# Patient Record
Sex: FEMALE | Race: ASIAN | NOT HISPANIC OR LATINO | URBAN - METROPOLITAN AREA
[De-identification: names, ages, dates, MRNs, and addresses within clinical notes are randomized per-mention and may not be internally consistent; named-entity substitution may affect disease eponyms.]

---

## 2018-07-18 ENCOUNTER — OUTPATIENT (OUTPATIENT)
Dept: OUTPATIENT SERVICES | Facility: HOSPITAL | Age: 38
LOS: 1 days | End: 2018-07-18
Payer: COMMERCIAL

## 2018-07-18 VITALS
HEIGHT: 60.5 IN | TEMPERATURE: 98 F | DIASTOLIC BLOOD PRESSURE: 87 MMHG | SYSTOLIC BLOOD PRESSURE: 144 MMHG | HEART RATE: 82 BPM | RESPIRATION RATE: 16 BRPM | WEIGHT: 190.04 LBS

## 2018-07-18 DIAGNOSIS — Z98.89 OTHER SPECIFIED POSTPROCEDURAL STATES: Chronic | ICD-10-CM

## 2018-07-18 DIAGNOSIS — Z90.49 ACQUIRED ABSENCE OF OTHER SPECIFIED PARTS OF DIGESTIVE TRACT: Chronic | ICD-10-CM

## 2018-07-18 DIAGNOSIS — D25.9 LEIOMYOMA OF UTERUS, UNSPECIFIED: ICD-10-CM

## 2018-07-18 DIAGNOSIS — N93.8 OTHER SPECIFIED ABNORMAL UTERINE AND VAGINAL BLEEDING: ICD-10-CM

## 2018-07-18 DIAGNOSIS — E55.9 VITAMIN D DEFICIENCY, UNSPECIFIED: ICD-10-CM

## 2018-07-18 DIAGNOSIS — D50.9 IRON DEFICIENCY ANEMIA, UNSPECIFIED: ICD-10-CM

## 2018-07-18 DIAGNOSIS — Z98.890 OTHER SPECIFIED POSTPROCEDURAL STATES: Chronic | ICD-10-CM

## 2018-07-18 DIAGNOSIS — R10.2 PELVIC AND PERINEAL PAIN: ICD-10-CM

## 2018-07-18 DIAGNOSIS — Z01.818 ENCOUNTER FOR OTHER PREPROCEDURAL EXAMINATION: ICD-10-CM

## 2018-07-18 DIAGNOSIS — I10 ESSENTIAL (PRIMARY) HYPERTENSION: ICD-10-CM

## 2018-07-18 LAB
ALBUMIN SERPL ELPH-MCNC: 3.6 G/DL — SIGNIFICANT CHANGE UP (ref 3.3–5)
ALP SERPL-CCNC: 78 U/L — SIGNIFICANT CHANGE UP (ref 40–120)
ALT FLD-CCNC: 24 U/L — SIGNIFICANT CHANGE UP (ref 12–78)
ANION GAP SERPL CALC-SCNC: 7 MMOL/L — SIGNIFICANT CHANGE UP (ref 5–17)
AST SERPL-CCNC: 17 U/L — SIGNIFICANT CHANGE UP (ref 15–37)
BILIRUB SERPL-MCNC: 0.3 MG/DL — SIGNIFICANT CHANGE UP (ref 0.2–1.2)
BUN SERPL-MCNC: 14 MG/DL — SIGNIFICANT CHANGE UP (ref 7–23)
CALCIUM SERPL-MCNC: 8.8 MG/DL — SIGNIFICANT CHANGE UP (ref 8.5–10.1)
CHLORIDE SERPL-SCNC: 107 MMOL/L — SIGNIFICANT CHANGE UP (ref 96–108)
CO2 SERPL-SCNC: 27 MMOL/L — SIGNIFICANT CHANGE UP (ref 22–31)
CREAT SERPL-MCNC: 0.64 MG/DL — SIGNIFICANT CHANGE UP (ref 0.5–1.3)
GLUCOSE SERPL-MCNC: 107 MG/DL — HIGH (ref 70–99)
HCG SERPL-ACNC: <1 MIU/ML — SIGNIFICANT CHANGE UP
HCT VFR BLD CALC: 34.1 % — LOW (ref 34.5–45)
HGB BLD-MCNC: 10.9 G/DL — LOW (ref 11.5–15.5)
MCHC RBC-ENTMCNC: 24.3 PG — LOW (ref 27–34)
MCHC RBC-ENTMCNC: 32 GM/DL — SIGNIFICANT CHANGE UP (ref 32–36)
MCV RBC AUTO: 76.1 FL — LOW (ref 80–100)
NRBC # BLD: 0 /100 WBCS — SIGNIFICANT CHANGE UP (ref 0–0)
PLATELET # BLD AUTO: 336 K/UL — SIGNIFICANT CHANGE UP (ref 150–400)
POTASSIUM SERPL-MCNC: 4.2 MMOL/L — SIGNIFICANT CHANGE UP (ref 3.5–5.3)
POTASSIUM SERPL-SCNC: 4.2 MMOL/L — SIGNIFICANT CHANGE UP (ref 3.5–5.3)
PROT SERPL-MCNC: 7.5 G/DL — SIGNIFICANT CHANGE UP (ref 6–8.3)
RBC # BLD: 4.48 M/UL — SIGNIFICANT CHANGE UP (ref 3.8–5.2)
RBC # FLD: 15.2 % — HIGH (ref 10.3–14.5)
SODIUM SERPL-SCNC: 141 MMOL/L — SIGNIFICANT CHANGE UP (ref 135–145)
WBC # BLD: 6.14 K/UL — SIGNIFICANT CHANGE UP (ref 3.8–10.5)
WBC # FLD AUTO: 6.14 K/UL — SIGNIFICANT CHANGE UP (ref 3.8–10.5)

## 2018-07-18 NOTE — H&P PST ADULT - PROBLEM SELECTOR PLAN 1
38 yo female scheduled for Total Abdominal Hysterectomy-Bilateral Salpingo-oophorectomy on 7/27/18 with Dr Narvaez.  Check labs CBC CMP Type and Screen HCG  Stop Naprosyn 7/18/18  Hibiclens Scrubs for 2 days   Patient and her  verbalize understanding of instructions    Patient requests private room Admitting was notified

## 2018-07-18 NOTE — H&P PST ADULT - ASSESSMENT
38 yo female scheduled for Total Abdominal Hysterectomy-Bilateral Salpingo-oophorectomy on 7/27/18 with Dr Narvaez.

## 2018-07-18 NOTE — H&P PST ADULT - ABILITY TO HEAR (WITH HEARING AID OR HEARING APPLIANCE IF NORMALLY USED):
details… Adequate: hears normal conversation without difficulty/language barrier  understands some English

## 2018-07-18 NOTE — H&P PST ADULT - FAMILY HISTORY
Mother  Still living? No  Hypertension, Age at diagnosis: Age Unknown     Sibling  Still living? Unknown  Hypertension, Age at diagnosis: Age Unknown

## 2018-07-18 NOTE — H&P PST ADULT - NSANTHOSAYNRD_GEN_A_CORE
No. JOVI screening performed.  STOP BANG Legend: 0-2 = LOW Risk; 3-4 = INTERMEDIATE Risk; 5-8 = HIGH Risk

## 2018-07-23 RX ORDER — SODIUM CHLORIDE 9 MG/ML
1000 INJECTION, SOLUTION INTRAVENOUS
Qty: 0 | Refills: 0 | Status: DISCONTINUED | OUTPATIENT
Start: 2018-07-24 | End: 2018-07-24

## 2018-07-24 ENCOUNTER — INPATIENT (INPATIENT)
Facility: HOSPITAL | Age: 38
LOS: 3 days | Discharge: HOME | DRG: 743 | End: 2018-07-28
Attending: OBSTETRICS & GYNECOLOGY | Admitting: OBSTETRICS & GYNECOLOGY
Payer: COMMERCIAL

## 2018-07-24 VITALS
TEMPERATURE: 99 F | DIASTOLIC BLOOD PRESSURE: 88 MMHG | RESPIRATION RATE: 16 BRPM | OXYGEN SATURATION: 100 % | HEART RATE: 102 BPM | SYSTOLIC BLOOD PRESSURE: 127 MMHG

## 2018-07-24 DIAGNOSIS — Z90.49 ACQUIRED ABSENCE OF OTHER SPECIFIED PARTS OF DIGESTIVE TRACT: Chronic | ICD-10-CM

## 2018-07-24 DIAGNOSIS — Z98.89 OTHER SPECIFIED POSTPROCEDURAL STATES: Chronic | ICD-10-CM

## 2018-07-24 DIAGNOSIS — N93.8 OTHER SPECIFIED ABNORMAL UTERINE AND VAGINAL BLEEDING: ICD-10-CM

## 2018-07-24 DIAGNOSIS — Z98.890 OTHER SPECIFIED POSTPROCEDURAL STATES: Chronic | ICD-10-CM

## 2018-07-24 PROCEDURE — 88307 TISSUE EXAM BY PATHOLOGIST: CPT | Mod: 26

## 2018-07-24 PROCEDURE — 88305 TISSUE EXAM BY PATHOLOGIST: CPT | Mod: 26

## 2018-07-24 RX ORDER — CEFAZOLIN SODIUM 1 G
2000 VIAL (EA) INJECTION ONCE
Qty: 0 | Refills: 0 | Status: COMPLETED | OUTPATIENT
Start: 2018-07-24 | End: 2018-07-24

## 2018-07-24 RX ORDER — HYDROMORPHONE HYDROCHLORIDE 2 MG/ML
30 INJECTION INTRAMUSCULAR; INTRAVENOUS; SUBCUTANEOUS
Qty: 0 | Refills: 0 | Status: DISCONTINUED | OUTPATIENT
Start: 2018-07-24 | End: 2018-07-26

## 2018-07-24 RX ORDER — ONDANSETRON 8 MG/1
4 TABLET, FILM COATED ORAL ONCE
Qty: 0 | Refills: 0 | Status: DISCONTINUED | OUTPATIENT
Start: 2018-07-24 | End: 2018-07-27

## 2018-07-24 RX ORDER — ACETAMINOPHEN 500 MG
1000 TABLET ORAL ONCE
Qty: 0 | Refills: 0 | Status: COMPLETED | OUTPATIENT
Start: 2018-07-24 | End: 2018-07-24

## 2018-07-24 RX ORDER — CEFAZOLIN SODIUM 1 G
2000 VIAL (EA) INJECTION EVERY 8 HOURS
Qty: 0 | Refills: 0 | Status: COMPLETED | OUTPATIENT
Start: 2018-07-24 | End: 2018-07-25

## 2018-07-24 RX ORDER — DOCUSATE SODIUM 100 MG
100 CAPSULE ORAL THREE TIMES A DAY
Qty: 0 | Refills: 0 | Status: DISCONTINUED | OUTPATIENT
Start: 2018-07-24 | End: 2018-07-28

## 2018-07-24 RX ORDER — LOSARTAN POTASSIUM 100 MG/1
50 TABLET, FILM COATED ORAL DAILY
Qty: 0 | Refills: 0 | Status: DISCONTINUED | OUTPATIENT
Start: 2018-07-24 | End: 2018-07-28

## 2018-07-24 RX ORDER — CHOLECALCIFEROL (VITAMIN D3) 125 MCG
1 CAPSULE ORAL
Qty: 0 | Refills: 0 | COMMUNITY

## 2018-07-24 RX ORDER — FAMOTIDINE 10 MG/ML
0 INJECTION INTRAVENOUS
Qty: 0 | Refills: 0 | COMMUNITY

## 2018-07-24 RX ORDER — SODIUM CHLORIDE 9 MG/ML
1000 INJECTION, SOLUTION INTRAVENOUS
Qty: 0 | Refills: 0 | Status: DISCONTINUED | OUTPATIENT
Start: 2018-07-24 | End: 2018-07-26

## 2018-07-24 RX ORDER — OXYCODONE HYDROCHLORIDE 5 MG/1
5 TABLET ORAL EVERY 4 HOURS
Qty: 0 | Refills: 0 | Status: DISCONTINUED | OUTPATIENT
Start: 2018-07-24 | End: 2018-07-24

## 2018-07-24 RX ORDER — FAMOTIDINE 10 MG/ML
20 INJECTION INTRAVENOUS
Qty: 0 | Refills: 0 | Status: DISCONTINUED | OUTPATIENT
Start: 2018-07-24 | End: 2018-07-28

## 2018-07-24 RX ORDER — HYDROMORPHONE HYDROCHLORIDE 2 MG/ML
0.5 INJECTION INTRAMUSCULAR; INTRAVENOUS; SUBCUTANEOUS
Qty: 0 | Refills: 0 | Status: DISCONTINUED | OUTPATIENT
Start: 2018-07-24 | End: 2018-07-24

## 2018-07-24 RX ORDER — ONDANSETRON 8 MG/1
4 TABLET, FILM COATED ORAL ONCE
Qty: 0 | Refills: 0 | Status: DISCONTINUED | OUTPATIENT
Start: 2018-07-24 | End: 2018-07-24

## 2018-07-24 RX ORDER — SODIUM CHLORIDE 9 MG/ML
1000 INJECTION, SOLUTION INTRAVENOUS
Qty: 0 | Refills: 0 | Status: DISCONTINUED | OUTPATIENT
Start: 2018-07-24 | End: 2018-07-24

## 2018-07-24 RX ADMIN — SODIUM CHLORIDE 75 MILLILITER(S): 9 INJECTION, SOLUTION INTRAVENOUS at 08:03

## 2018-07-24 RX ADMIN — HYDROMORPHONE HYDROCHLORIDE 30 MILLILITER(S): 2 INJECTION INTRAMUSCULAR; INTRAVENOUS; SUBCUTANEOUS at 15:08

## 2018-07-24 RX ADMIN — Medication 100 MILLIGRAM(S): at 20:48

## 2018-07-24 RX ADMIN — SODIUM CHLORIDE 75 MILLILITER(S): 9 INJECTION, SOLUTION INTRAVENOUS at 14:39

## 2018-07-24 RX ADMIN — HYDROMORPHONE HYDROCHLORIDE 30 MILLILITER(S): 2 INJECTION INTRAMUSCULAR; INTRAVENOUS; SUBCUTANEOUS at 19:17

## 2018-07-24 RX ADMIN — FAMOTIDINE 20 MILLIGRAM(S): 10 INJECTION INTRAVENOUS at 17:21

## 2018-07-24 RX ADMIN — HYDROMORPHONE HYDROCHLORIDE 30 MILLILITER(S): 2 INJECTION INTRAMUSCULAR; INTRAVENOUS; SUBCUTANEOUS at 16:11

## 2018-07-24 NOTE — BRIEF OPERATIVE NOTE - PROCEDURE
<<-----Click on this checkbox to enter Procedure Repair of cystotomy  07/24/2018    Active  [email or web address]  Repair of serosal tear of intestine  07/24/2018    Active  [email or web address]  Lysis of adhesions  07/24/2018    Active  [email or web address]

## 2018-07-24 NOTE — BRIEF OPERATIVE NOTE - PRE-OP DX
Intramural, submucous, and subserous leiomyoma of uterus  07/24/2018    Aziza Trevino  Pelvic pain in female  07/24/2018    Aziza Trevino  Uterine bleeding  07/24/2018    Aziza Trevino

## 2018-07-24 NOTE — BRIEF OPERATIVE NOTE - POST-OP DX
Endometriosis  07/24/2018    Aziza Trevino  Intramural, submucous, and subserous leiomyoma of uterus  07/24/2018    Aziza Trevino

## 2018-07-24 NOTE — BRIEF OPERATIVE NOTE - OPERATION/FINDINGS
extensive adhesions noted in the pelvic region. with evidence of endometriosis and ovarian endometrioma

## 2018-07-24 NOTE — ASU PREOP CHECKLIST - HEIGHT IN CM
Subcostal TAP    Patient location during procedure: pre-op  Start time: 6/16/2017 8:15 AM  Stop time: 6/16/2017 8:25 AM  Reason for block: at surgeon's request and post-op pain management  Performed by  Anesthesiologist: DANE BARRETT  CRNA: SB SUN  Preanesthetic Checklist  Completed: patient identified, site marked, surgical consent, pre-op evaluation, timeout performed, IV checked, risks and benefits discussed and monitors and equipment checked  Peripheral Block Prep:  Sterile barriers:cap, gloves, sterile barriers and mask  Prep: ChloraPrep  Patient monitoring: blood pressure monitoring, continuous pulse oximetry and EKG  Peripheral Procedure  Nursing cardiac assessment comments yes: GA.  Guidance:ultrasound guided  Images:still images obtained    Laterality:Bilateral  Block Type:TAP  Injection Technique:single-shot  Needle Type:short-bevel  Needle Gauge:20 G    Medications  Comment:Block Injection:  LA dose divided between Right and Left block       Adjuncts:  Decadron 4mg PSF, Buprenex 0.3mg (Per total volume of LA)  Local Injected:bupivacaine 0.25% Local Amount Injected:40mL  Post Assessment  Injection Assessment: negative aspiration for heme, incremental injection and no paresthesia on injection  Patient Tolerance:comfortable throughout block  Complications:no  Additional Notes  The pt was placed in the Supine Position and was  anesthetized with:       General Anesthesia     Under Ultrasound guidance, a BBraun 4inch 360 degree needle was advanced with Normal Saline hydro dissection of tissue.  The Internal Oblique and Transversus Abdominus muscles where visualized.  At or before the aponeurosis of Internal Oblique, local anesthetic spread was visualized in the Transversus Abdominus Plane. Injection was made incrementally with aspiration every 5 mls.  There was no  intravascular injection,  injection pressure was normal, there was no neural injection, and the procedure was completed without  difficulty.  Thank You.             153.92

## 2018-07-24 NOTE — PATIENT PROFILE ADULT. - PMH
Endometriosis    Hypertension    Hypertension    Iron deficiency anemia    Leiomyoma of uterus    Vitamin D deficiency

## 2018-07-24 NOTE — PATIENT PROFILE ADULT. - LANGUAGE ASSISTANCE NEEDED
gabriel is aware that she can have  at any time during hospitalization Yes-Patient/Caregiver accepts free interpretation services.../alphonson is aware that she can have  at any time during hospitalization Intrepretor #[id number] used during admission on 7/24/18

## 2018-07-24 NOTE — BRIEF OPERATIVE NOTE - PROCEDURE
<<-----Click on this checkbox to enter Procedure Cystostomy, closure  07/24/2018  partial  Active  [email or web address]  Supracervical hysterectomy with salpingo-oophorectomy by abdominal approach  07/24/2018    Active  [email or web address]  Lysis of adhesions  07/24/2018    Active  SSHIFTEH1

## 2018-07-25 DIAGNOSIS — N99.89 OTHER POSTPROCEDURAL COMPLICATIONS AND DISORDERS OF GENITOURINARY SYSTEM: ICD-10-CM

## 2018-07-25 LAB
ANION GAP SERPL CALC-SCNC: 7 MMOL/L — SIGNIFICANT CHANGE UP (ref 5–17)
BASOPHILS # BLD AUTO: 0 K/UL — SIGNIFICANT CHANGE UP (ref 0–0.2)
BASOPHILS NFR BLD AUTO: 0 % — SIGNIFICANT CHANGE UP (ref 0–2)
BUN SERPL-MCNC: 10 MG/DL — SIGNIFICANT CHANGE UP (ref 7–23)
CALCIUM SERPL-MCNC: 7.6 MG/DL — LOW (ref 8.5–10.1)
CHLORIDE SERPL-SCNC: 106 MMOL/L — SIGNIFICANT CHANGE UP (ref 96–108)
CO2 SERPL-SCNC: 25 MMOL/L — SIGNIFICANT CHANGE UP (ref 22–31)
CREAT SERPL-MCNC: 0.81 MG/DL — SIGNIFICANT CHANGE UP (ref 0.5–1.3)
EOSINOPHIL # BLD AUTO: 0 K/UL — SIGNIFICANT CHANGE UP (ref 0–0.5)
EOSINOPHIL NFR BLD AUTO: 0 % — SIGNIFICANT CHANGE UP (ref 0–6)
GLUCOSE SERPL-MCNC: 128 MG/DL — HIGH (ref 70–99)
HCT VFR BLD CALC: 25.7 % — LOW (ref 34.5–45)
HGB BLD-MCNC: 8.3 G/DL — LOW (ref 11.5–15.5)
HYPOCHROMIA BLD QL: SLIGHT — SIGNIFICANT CHANGE UP
LYMPHOCYTES # BLD AUTO: 18 % — SIGNIFICANT CHANGE UP (ref 13–44)
LYMPHOCYTES # BLD AUTO: 2.88 K/UL — SIGNIFICANT CHANGE UP (ref 1–3.3)
MANUAL SMEAR VERIFICATION: SIGNIFICANT CHANGE UP
MCHC RBC-ENTMCNC: 25.9 PG — LOW (ref 27–34)
MCHC RBC-ENTMCNC: 32.3 GM/DL — SIGNIFICANT CHANGE UP (ref 32–36)
MCV RBC AUTO: 80.1 FL — SIGNIFICANT CHANGE UP (ref 80–100)
MICROCYTES BLD QL: SLIGHT — SIGNIFICANT CHANGE UP
MONOCYTES # BLD AUTO: 1.44 K/UL — HIGH (ref 0–0.9)
MONOCYTES NFR BLD AUTO: 9 % — SIGNIFICANT CHANGE UP (ref 2–14)
NEUTROPHILS # BLD AUTO: 11.53 K/UL — HIGH (ref 1.8–7.4)
NEUTROPHILS NFR BLD AUTO: 71 % — SIGNIFICANT CHANGE UP (ref 43–77)
NEUTS BAND # BLD: 1 % — SIGNIFICANT CHANGE UP (ref 0–8)
NRBC # BLD: 0 — SIGNIFICANT CHANGE UP
NRBC # BLD: SIGNIFICANT CHANGE UP /100 WBCS (ref 0–0)
PLAT MORPH BLD: NORMAL — SIGNIFICANT CHANGE UP
PLATELET # BLD AUTO: 204 K/UL — SIGNIFICANT CHANGE UP (ref 150–400)
POTASSIUM SERPL-MCNC: 5 MMOL/L — SIGNIFICANT CHANGE UP (ref 3.5–5.3)
POTASSIUM SERPL-SCNC: 5 MMOL/L — SIGNIFICANT CHANGE UP (ref 3.5–5.3)
RBC # BLD: 3.21 M/UL — LOW (ref 3.8–5.2)
RBC # FLD: 15.9 % — HIGH (ref 10.3–14.5)
RBC BLD AUTO: SIGNIFICANT CHANGE UP
SODIUM SERPL-SCNC: 138 MMOL/L — SIGNIFICANT CHANGE UP (ref 135–145)
VARIANT LYMPHS # BLD: 1 % — SIGNIFICANT CHANGE UP (ref 0–6)
WBC # BLD: 16.02 K/UL — HIGH (ref 3.8–10.5)
WBC # FLD AUTO: 16.02 K/UL — HIGH (ref 3.8–10.5)

## 2018-07-25 RX ORDER — ENOXAPARIN SODIUM 100 MG/ML
40 INJECTION SUBCUTANEOUS DAILY
Qty: 0 | Refills: 0 | Status: DISCONTINUED | OUTPATIENT
Start: 2018-07-25 | End: 2018-07-26

## 2018-07-25 RX ADMIN — HYDROMORPHONE HYDROCHLORIDE 30 MILLILITER(S): 2 INJECTION INTRAMUSCULAR; INTRAVENOUS; SUBCUTANEOUS at 07:08

## 2018-07-25 RX ADMIN — SODIUM CHLORIDE 120 MILLILITER(S): 9 INJECTION, SOLUTION INTRAVENOUS at 10:26

## 2018-07-25 RX ADMIN — ENOXAPARIN SODIUM 40 MILLIGRAM(S): 100 INJECTION SUBCUTANEOUS at 12:33

## 2018-07-25 RX ADMIN — FAMOTIDINE 20 MILLIGRAM(S): 10 INJECTION INTRAVENOUS at 18:24

## 2018-07-25 RX ADMIN — Medication 100 MILLIGRAM(S): at 06:01

## 2018-07-25 RX ADMIN — Medication 100 MILLIGRAM(S): at 14:52

## 2018-07-25 RX ADMIN — LOSARTAN POTASSIUM 50 MILLIGRAM(S): 100 TABLET, FILM COATED ORAL at 06:00

## 2018-07-25 RX ADMIN — HYDROMORPHONE HYDROCHLORIDE 30 MILLILITER(S): 2 INJECTION INTRAMUSCULAR; INTRAVENOUS; SUBCUTANEOUS at 19:27

## 2018-07-25 RX ADMIN — FAMOTIDINE 20 MILLIGRAM(S): 10 INJECTION INTRAVENOUS at 06:00

## 2018-07-25 NOTE — PROGRESS NOTE ADULT - SUBJECTIVE AND OBJECTIVE BOX
pt seen  Sitting in chair comfortable  some pain R side, controlled with meds  -n-v  -f-bm  ICU Vital Signs Last 24 Hrs  T(C): 37.4 (25 Jul 2018 07:18), Max: 37.4 (25 Jul 2018 07:18)  T(F): 99.3 (25 Jul 2018 07:18), Max: 99.3 (25 Jul 2018 07:18)  HR: 103 (25 Jul 2018 11:19) (81 - 109)  BP: 117/68 (25 Jul 2018 11:19) (100/57 - 137/81)  BP(mean): --  ABP: --  ABP(mean): --  RR: 18 (25 Jul 2018 11:19) (11 - 18)  SpO2: 98% (25 Jul 2018 11:19) (98% - 100%)  gen-NAD  resp-clear b/l  abd-soft ND, appropriate tenderness  dressing c/d/i  DAGO-sanguinous drainage                          8.3    16.02 )-----------( 204      ( 25 Jul 2018 06:02 )             25.7   07-25    138  |  106  |  10  ----------------------------<  128<H>  5.0   |  25  |  0.81    Ca    7.6<L>      25 Jul 2018 06:02      I&O's Detail    24 Jul 2018 07:01  -  25 Jul 2018 07:00  --------------------------------------------------------  IN:    lactated ringers.: 225 mL    lactated ringers.: 1440 mL  Total IN: 1665 mL    OUT:    Bulb: 100 mL    Indwelling Catheter - Urethral: 850 mL  Total OUT: 950 mL    Total NET: 715 mL      25 Jul 2018 07:01  -  25 Jul 2018 11:53  --------------------------------------------------------  IN:    lactated ringers.: 1000 mL  Total IN: 1000 mL    OUT:  Total OUT: 0 mL    Total NET: 1000 mL

## 2018-07-25 NOTE — PROGRESS NOTE ADULT - ASSESSMENT
38 yo s/p hysterectomy, BENY and repair bladder injury      -cont wells, DAGO drains      -adv to fulls      -encourage ambulation       -lovenox

## 2018-07-25 NOTE — PROVIDER CONTACT NOTE (OTHER) - ACTION/TREATMENT ORDERED:
Please encourage pt to use the PCA pump more frequently. Order CBC for the morning if not already ordered.

## 2018-07-25 NOTE — PROGRESS NOTE ADULT - SUBJECTIVE AND OBJECTIVE BOX
The patient was interviewed and evaluated  37y Female s/p supracervical hysterectomy and BSO with GA    T(C): 37.4 (07-25-18 @ 07:18), Max: 37.4 (07-25-18 @ 07:18)  HR: 103 (07-25-18 @ 11:19) (81 - 109)  BP: 117/68 (07-25-18 @ 11:19) (104/70 - 137/81)  RR: 18 (07-25-18 @ 11:19) (14 - 18)  SpO2: 98% (07-25-18 @ 11:19) (98% - 100%)  Wt(kg): --    Pt seen, doing well, no anesthesia complications or complaints noted or reported.   No Nausea    No additional recommendations.     Pain well controlled

## 2018-07-25 NOTE — PROGRESS NOTE ADULT - SUBJECTIVE AND OBJECTIVE BOX
INTERVAL HPI/OVERNIGHT EVENTS: Johnson draining well. Johnson output 450 cc/shift, DAGO output 100 cc/shift.    MEDICATIONS  (STANDING):  ceFAZolin   IVPB 2000 milliGRAM(s) IV Intermittent every 8 hours  famotidine    Tablet 20 milliGRAM(s) Oral two times a day  HYDROmorphone PCA (1 mG/mL) 30 milliLiter(s) PCA Continuous PCA Continuous  lactated ringers. 1000 milliLiter(s) (120 mL/Hr) IV Continuous <Continuous>  losartan 50 milliGRAM(s) Oral daily    MEDICATIONS  (PRN):  docusate sodium 100 milliGRAM(s) Oral three times a day PRN Stool Softening  ondansetron Injectable 4 milliGRAM(s) IV Push once PRN Postoperative Nausea and/or Vomiting        Vital Signs Last 24 Hrs  T(C): 37.1 (24 Jul 2018 23:44), Max: 37.1 (24 Jul 2018 07:30)  T(F): 98.8 (24 Jul 2018 23:44), Max: 98.8 (24 Jul 2018 07:30)  HR: 107 (24 Jul 2018 23:44) (81 - 108)  BP: 130/87 (24 Jul 2018 23:44) (100/57 - 137/81)  BP(mean): --  RR: 16 (24 Jul 2018 23:44) (11 - 16)  SpO2: 100% (24 Jul 2018 23:44) (98% - 100%)    PHYSICAL EXAM:    ABDOMEN: Dressing clean and dry. DAGO with serosanguinous output  GENITALIA: Johnson in place, draining well. Urine blood tinged.    LABS:                        10.5   21.64 )-----------( 292      ( 24 Jul 2018 15:03 )             32.0           PT/INR - ( 24 Jul 2018 14:59 )   PT: 13.1 sec;   INR: 1.20 ratio         PTT - ( 24 Jul 2018 14:59 )  PTT:25.8 sec

## 2018-07-25 NOTE — PROVIDER CONTACT NOTE (OTHER) - ASSESSMENT
Apical rate is regular. Pt appears to be in pain. All the rest of pt's vital signs are her normal values. Pt is using the PCA pump minimally.

## 2018-07-25 NOTE — PROGRESS NOTE ADULT - PROBLEM SELECTOR PLAN 1
S/p repair 7/34/18. Johnson draining well. Plan for Johnson to be left in place and cystogram to be performed 10 days post operatively (8/3) which can be performed as an outpatient.

## 2018-07-25 NOTE — PROGRESS NOTE ADULT - SUBJECTIVE AND OBJECTIVE BOX
Pod#1  Pt.is seen and evaluated. She is tolerating clears, ambulated and Comfortable with the written pain medications.  Afebrile/VSS  O/e;  Intact dressing,  DAGO aprox: 25 cc, Serosangneous  Johnson draining clear urine  SCD in place

## 2018-07-25 NOTE — PROGRESS NOTE ADULT - SUBJECTIVE AND OBJECTIVE BOX
POD#1  afebrile, HG=8.3, WBC slightly better=16000  No vomiting, taking PO clears well,  ambulated OK  abdomen soft, not distended, dressing dry.  DAGO drained [id number] from 7AM today, serosanguineous fluid only  Johnson draining copious clear urine (not grossly bloody)  minimal periincisional pain  legs=no edema, no calf tenderness  Continue postop care

## 2018-07-25 NOTE — PROGRESS NOTE ADULT - SUBJECTIVE AND OBJECTIVE BOX
Post Op Day _1__ of Anesthesia Pain Management Service    SUBJECTIVE: comfortable  		  OBJECTIVE:   Pain Score:  2 /10  Therapy:	[x ] IV PCA	[ ] Epidural   [ ] s/p Spinal Opioid	[ ] Peripheral nerve block  	  Vital Signs Last 24 Hrs  T(C): 37.4 (25 Jul 2018 07:18), Max: 37.4 (25 Jul 2018 07:18)  T(F): 99.3 (25 Jul 2018 07:18), Max: 99.3 (25 Jul 2018 07:18)  HR: 103 (25 Jul 2018 11:19) (81 - 109)  BP: 117/68 (25 Jul 2018 11:19) (104/70 - 137/81)  BP(mean): --  RR: 18 (25 Jul 2018 11:19) (14 - 18)  SpO2: 98% (25 Jul 2018 11:19) (98% - 100%)    (x ) Alert & Oriented     ( ) No motor/sensory block     ( ) Nausea     ( ) Pruritis     ( ) Headache    ( ) Catheter Site Unremarkable    Assessment of Catheter Site:	[ ] Intact		[ ] Other:    ( ) Further Pain Rx Plan:  Oral Pain Medications    COMMENTS:Will continue PCA      ANTICOAGULATION STATUS:

## 2018-07-26 LAB
ANION GAP SERPL CALC-SCNC: 6 MMOL/L — SIGNIFICANT CHANGE UP (ref 5–17)
APTT BLD: 28.6 SEC — SIGNIFICANT CHANGE UP (ref 27.5–37.4)
BUN SERPL-MCNC: 6 MG/DL — LOW (ref 7–23)
CALCIUM SERPL-MCNC: 7.6 MG/DL — LOW (ref 8.5–10.1)
CHLORIDE SERPL-SCNC: 108 MMOL/L — SIGNIFICANT CHANGE UP (ref 96–108)
CO2 SERPL-SCNC: 28 MMOL/L — SIGNIFICANT CHANGE UP (ref 22–31)
CREAT SERPL-MCNC: 0.48 MG/DL — LOW (ref 0.5–1.3)
GLUCOSE SERPL-MCNC: 100 MG/DL — HIGH (ref 70–99)
HCT VFR BLD CALC: 20.3 % — CRITICAL LOW (ref 34.5–45)
HCT VFR BLD CALC: 28.2 % — LOW (ref 34.5–45)
HGB BLD-MCNC: 6.9 G/DL — CRITICAL LOW (ref 11.5–15.5)
HGB BLD-MCNC: 9.5 G/DL — LOW (ref 11.5–15.5)
INR BLD: 1.21 RATIO — HIGH (ref 0.88–1.16)
MCHC RBC-ENTMCNC: 27.2 PG — SIGNIFICANT CHANGE UP (ref 27–34)
MCHC RBC-ENTMCNC: 27.5 PG — SIGNIFICANT CHANGE UP (ref 27–34)
MCHC RBC-ENTMCNC: 33.7 GM/DL — SIGNIFICANT CHANGE UP (ref 32–36)
MCHC RBC-ENTMCNC: 34 GM/DL — SIGNIFICANT CHANGE UP (ref 32–36)
MCV RBC AUTO: 79.9 FL — LOW (ref 80–100)
MCV RBC AUTO: 81.5 FL — SIGNIFICANT CHANGE UP (ref 80–100)
NRBC # BLD: 0 /100 WBCS — SIGNIFICANT CHANGE UP (ref 0–0)
NRBC # BLD: 0 /100 WBCS — SIGNIFICANT CHANGE UP (ref 0–0)
PLATELET # BLD AUTO: 186 K/UL — SIGNIFICANT CHANGE UP (ref 150–400)
PLATELET # BLD AUTO: 193 K/UL — SIGNIFICANT CHANGE UP (ref 150–400)
POTASSIUM SERPL-MCNC: 3.7 MMOL/L — SIGNIFICANT CHANGE UP (ref 3.5–5.3)
POTASSIUM SERPL-SCNC: 3.7 MMOL/L — SIGNIFICANT CHANGE UP (ref 3.5–5.3)
PROTHROM AB SERPL-ACNC: 13.2 SEC — HIGH (ref 9.8–12.7)
RBC # BLD: 2.54 M/UL — LOW (ref 3.8–5.2)
RBC # BLD: 3.46 M/UL — LOW (ref 3.8–5.2)
RBC # FLD: 16.3 % — HIGH (ref 10.3–14.5)
RBC # FLD: 17 % — HIGH (ref 10.3–14.5)
SODIUM SERPL-SCNC: 142 MMOL/L — SIGNIFICANT CHANGE UP (ref 135–145)
SURGICAL PATHOLOGY FINAL REPORT - CH: SIGNIFICANT CHANGE UP
WBC # BLD: 11.55 K/UL — HIGH (ref 3.8–10.5)
WBC # BLD: 12.92 K/UL — HIGH (ref 3.8–10.5)
WBC # FLD AUTO: 11.55 K/UL — HIGH (ref 3.8–10.5)
WBC # FLD AUTO: 12.92 K/UL — HIGH (ref 3.8–10.5)

## 2018-07-26 PROCEDURE — G0463: CPT

## 2018-07-26 PROCEDURE — 80053 COMPREHEN METABOLIC PANEL: CPT

## 2018-07-26 PROCEDURE — 85027 COMPLETE CBC AUTOMATED: CPT

## 2018-07-26 PROCEDURE — 84702 CHORIONIC GONADOTROPIN TEST: CPT

## 2018-07-26 PROCEDURE — 86850 RBC ANTIBODY SCREEN: CPT

## 2018-07-26 PROCEDURE — 86900 BLOOD TYPING SEROLOGIC ABO: CPT

## 2018-07-26 PROCEDURE — 86923 COMPATIBILITY TEST ELECTRIC: CPT

## 2018-07-26 PROCEDURE — 86901 BLOOD TYPING SEROLOGIC RH(D): CPT

## 2018-07-26 RX ORDER — OXYCODONE AND ACETAMINOPHEN 5; 325 MG/1; MG/1
2 TABLET ORAL EVERY 4 HOURS
Qty: 0 | Refills: 0 | Status: DISCONTINUED | OUTPATIENT
Start: 2018-07-26 | End: 2018-07-28

## 2018-07-26 RX ORDER — DIPHENHYDRAMINE HCL 50 MG
25 CAPSULE ORAL ONCE
Qty: 0 | Refills: 0 | Status: COMPLETED | OUTPATIENT
Start: 2018-07-26 | End: 2018-07-26

## 2018-07-26 RX ORDER — OXYCODONE AND ACETAMINOPHEN 5; 325 MG/1; MG/1
1 TABLET ORAL EVERY 4 HOURS
Qty: 0 | Refills: 0 | Status: DISCONTINUED | OUTPATIENT
Start: 2018-07-26 | End: 2018-07-28

## 2018-07-26 RX ORDER — ACETAMINOPHEN 500 MG
650 TABLET ORAL ONCE
Qty: 0 | Refills: 0 | Status: COMPLETED | OUTPATIENT
Start: 2018-07-26 | End: 2018-07-26

## 2018-07-26 RX ORDER — MORPHINE SULFATE 50 MG/1
2 CAPSULE, EXTENDED RELEASE ORAL
Qty: 0 | Refills: 0 | Status: DISCONTINUED | OUTPATIENT
Start: 2018-07-26 | End: 2018-07-27

## 2018-07-26 RX ORDER — CEFAZOLIN SODIUM 1 G
2000 VIAL (EA) INJECTION EVERY 8 HOURS
Qty: 0 | Refills: 0 | Status: DISCONTINUED | OUTPATIENT
Start: 2018-07-26 | End: 2018-07-27

## 2018-07-26 RX ORDER — MORPHINE SULFATE 50 MG/1
2 CAPSULE, EXTENDED RELEASE ORAL EVERY 4 HOURS
Qty: 0 | Refills: 0 | Status: DISCONTINUED | OUTPATIENT
Start: 2018-07-26 | End: 2018-07-27

## 2018-07-26 RX ADMIN — Medication 100 MILLIGRAM(S): at 22:17

## 2018-07-26 RX ADMIN — HYDROMORPHONE HYDROCHLORIDE 30 MILLILITER(S): 2 INJECTION INTRAMUSCULAR; INTRAVENOUS; SUBCUTANEOUS at 07:09

## 2018-07-26 RX ADMIN — Medication 25 MILLIGRAM(S): at 17:21

## 2018-07-26 RX ADMIN — FAMOTIDINE 20 MILLIGRAM(S): 10 INJECTION INTRAVENOUS at 17:23

## 2018-07-26 RX ADMIN — MORPHINE SULFATE 2 MILLIGRAM(S): 50 CAPSULE, EXTENDED RELEASE ORAL at 11:30

## 2018-07-26 RX ADMIN — MORPHINE SULFATE 2 MILLIGRAM(S): 50 CAPSULE, EXTENDED RELEASE ORAL at 11:13

## 2018-07-26 RX ADMIN — Medication 650 MILLIGRAM(S): at 16:00

## 2018-07-26 RX ADMIN — Medication 100 MILLIGRAM(S): at 15:15

## 2018-07-26 RX ADMIN — LOSARTAN POTASSIUM 50 MILLIGRAM(S): 100 TABLET, FILM COATED ORAL at 05:33

## 2018-07-26 RX ADMIN — FAMOTIDINE 20 MILLIGRAM(S): 10 INJECTION INTRAVENOUS at 05:33

## 2018-07-26 NOTE — PROGRESS NOTE ADULT - ASSESSMENT
POD #3  All info obtained through her , who translates to English  Tm 100.7 overnight  Awake alert but fatigued  Dhaval clear fluids diet now  Wells draining well, output good; statlock removed as it was pulling on wells and adding to wells discomfort  Hgb 6.9, being transfused today  WBC is now lower at 11.55 K  DAGO drainage is acceptable  PE abd soft, dressing dry and intact  Have explained to pt and her  via translation that the wells is to remain for 10 postoperative days and then cystogram to be done

## 2018-07-26 NOTE — PROGRESS NOTE ADULT - SUBJECTIVE AND OBJECTIVE BOX
Pt.is seen and evaluated. Complete note to follow.  Hb 6.9,Ist unit of PRBC in process.  Will advance the diet after the transfusion.  To f/u Post transfusion cbc.  To cont.the current care.

## 2018-07-26 NOTE — PROGRESS NOTE ADULT - SUBJECTIVE AND OBJECTIVE BOX
patient status post: abdominal hysterectomy with bilateral oophorectomy       POD # 2  called for drop[ of H/H . patient seen in bed awake and alert, feels weak but no dizziness, palpitation or cp reported. abdomin soft with minimal tenderness. incision intact. mikey intact with serosanguinous drainage. wells intact  plan: infuse 2 units of blood.  monitor I&O and vitals  DR miranda and butch notified of H/H  Vital Signs Last 24 Hrs  T(C): 37.5 (26 Jul 2018 07:13), Max: 38.2 (26 Jul 2018 04:50)  T(F): 99.5 (26 Jul 2018 07:13), Max: 100.7 (26 Jul 2018 04:50)  HR: 122 (26 Jul 2018 07:13) (103 - 126)  BP: 134/85 (26 Jul 2018 07:13) (112/75 - 134/85)  BP(mean): --  RR: 18 (26 Jul 2018 07:13) (18 - 18)  SpO2: 98% (26 Jul 2018 07:13) (97% - 99%)      LABS:                        6.9    11.55 )-----------( 193      ( 26 Jul 2018 08:00 )             20.3     07-26    142  |  108  |  6<L>  ----------------------------<  100<H>  3.7   |  28  |  0.48<L>    Ca    7.6<L>      26 Jul 2018 08:00      PT/INR - ( 24 Jul 2018 14:59 )   PT: 13.1 sec;   INR: 1.20 ratio         PTT - ( 24 Jul 2018 14:59 )  PTT:25.8 sec      MEDICATIONS  (STANDING):  enoxaparin Injectable 40 milliGRAM(s) SubCutaneous daily  famotidine    Tablet 20 milliGRAM(s) Oral two times a day  HYDROmorphone PCA (1 mG/mL) 30 milliLiter(s) PCA Continuous PCA Continuous  lactated ringers. 1000 milliLiter(s) (120 mL/Hr) IV Continuous <Continuous>  losartan 50 milliGRAM(s) Oral daily    MEDICATIONS  (PRN):  docusate sodium 100 milliGRAM(s) Oral three times a day PRN Stool Softening  ondansetron Injectable 4 milliGRAM(s) IV Push once PRN Postoperative Nausea and/or Vomiting patient status post: abdominal hysterectomy with bilateral oophorectomy       POD # 3  called for drop[ of H/H . patient seen in bed awake and alert, feels weak but no dizziness, palpitation, SOB or cp reported. abdomin soft with minimal tenderness. incision intact. mikey intact with minimal  serosanguinous drainage. wells intact with good output  plan: infuse 2 units of blood.  monitor I&O and vitals  DR miranda and butch notified of H/H  Vital Signs Last 24 Hrs  T(C): 37.5 (26 Jul 2018 07:13), Max: 38.2 (26 Jul 2018 04:50)  T(F): 99.5 (26 Jul 2018 07:13), Max: 100.7 (26 Jul 2018 04:50)  HR: 122 (26 Jul 2018 07:13) (103 - 126)  BP: 134/85 (26 Jul 2018 07:13) (112/75 - 134/85)  BP(mean): --  RR: 18 (26 Jul 2018 07:13) (18 - 18)  SpO2: 98% (26 Jul 2018 07:13) (97% - 99%)      LABS:                        6.9    11.55 )-----------( 193      ( 26 Jul 2018 08:00 )             20.3     07-26    142  |  108  |  6<L>  ----------------------------<  100<H>  3.7   |  28  |  0.48<L>    Ca    7.6<L>      26 Jul 2018 08:00      PT/INR - ( 24 Jul 2018 14:59 )   PT: 13.1 sec;   INR: 1.20 ratio         PTT - ( 24 Jul 2018 14:59 )  PTT:25.8 sec      MEDICATIONS  (STANDING):  enoxaparin Injectable 40 milliGRAM(s) SubCutaneous daily  famotidine    Tablet 20 milliGRAM(s) Oral two times a day  HYDROmorphone PCA (1 mG/mL) 30 milliLiter(s) PCA Continuous PCA Continuous  lactated ringers. 1000 milliLiter(s) (120 mL/Hr) IV Continuous <Continuous>  losartan 50 milliGRAM(s) Oral daily    MEDICATIONS  (PRN):  docusate sodium 100 milliGRAM(s) Oral three times a day PRN Stool Softening  ondansetron Injectable 4 milliGRAM(s) IV Push once PRN Postoperative Nausea and/or Vomiting

## 2018-07-26 NOTE — PROGRESS NOTE ADULT - SUBJECTIVE AND OBJECTIVE BOX
POD#2  afebrile, feels better after first unit PRBC, 2nd unit in progress  tolerating regular diet. flatus -  DAGO much less bloody, almost clear  abdomen soft, not distended, slight incisional tenderness  incision clean and dry  legs: no calf tenderness  continue postop care.    Urology and General Surgery follow up appreciated.

## 2018-07-26 NOTE — PROGRESS NOTE ADULT - SUBJECTIVE AND OBJECTIVE BOX
Post Op Day _2__ of Anesthesia Pain Management Service    SUBJECTIVE: np c/o  		  OBJECTIVE:     Pain Score:      1 /10    Therapy:	[  x] IV PCA	  [  ] Epidural     [  ] s/p Spinal Opioid     [ ] Peripheral nerve block  	  Vital Signs Last 24 Hrs  T(C): 37.8 (26 Jul 2018 09:37), Max: 38.2 (26 Jul 2018 04:50)  T(F): 100 (26 Jul 2018 09:37), Max: 100.7 (26 Jul 2018 04:50)  HR: 114 (26 Jul 2018 09:37) (103 - 126)  BP: 125/84 (26 Jul 2018 09:37) (112/75 - 134/85)  BP(mean): --  RR: 18 (26 Jul 2018 09:37) (18 - 18)  SpO2: 100% (26 Jul 2018 09:37) (97% - 100%)    ( x ) Alert & Oriented          ( - ) Nausea     ( - ) Pruritis     ( - ) Headache    (  ) Catheter Site Unremarkable    ( x )  N/A    Assessment of Catheter Site:	[  ] Intact   [  x ] N/A	[  ] Other:        COMMENTS: tolerating PO.  Therefore, D/C PCA, start po Rx

## 2018-07-26 NOTE — PROGRESS NOTE ADULT - SUBJECTIVE AND OBJECTIVE BOX
Subjective: Pt c/o pain.    Objective:  Vital Signs Last 24 Hrs  T(C): 37.3 (26 Jul 2018 11:36), Max: 38.2 (26 Jul 2018 04:50)  T(F): 99.2 (26 Jul 2018 11:36), Max: 100.7 (26 Jul 2018 04:50)  HR: 121 (26 Jul 2018 11:36) (107 - 126)  BP: 131/81 (26 Jul 2018 11:36) (112/75 - 134/85)  BP(mean): --  RR: 20 (26 Jul 2018 11:36) (18 - 20)  SpO2: 96% (26 Jul 2018 11:36) (96% - 100%)    Heent: CALISEAN  Pul: clear  Cor: RSR, without murmurs  Abdomen: normal bowel sounds, without distension  Incision clean and closed,  DAGO with serosanguinous drainage  Extremities: without edema, motor/sensory intact, without calf pain, Homans sign negative.                        6.9    11.55 )-----------( 193      ( 26 Jul 2018 08:00 )             20.3       07-26    142  |  108  |  6<L>  ----------------------------<  100<H>  3.7   |  28  |  0.48<L>    Ca    7.6<L>      26 Jul 2018 08:00          07-25 @ 07:01  -  07-26 @ 07:00  --------------------------------------------------------  IN:    IV PiggyBack: 50 mL    lactated ringers.: 1000 mL  Total IN: 1050 mL    OUT:    Bulb: 145 mL    Indwelling Catheter - Urethral: 3850 mL  Total OUT: 3995 mL    Total NET: -2945 mL      07-26 @ 07:01  -  07-26 @ 12:20  --------------------------------------------------------  IN:  Total IN: 0 mL    OUT:    Indwelling Catheter - Urethral: 1200 mL  Total OUT: 1200 mL    Total NET: -1200 mL

## 2018-07-26 NOTE — PROGRESS NOTE ADULT - SUBJECTIVE AND OBJECTIVE BOX
PAST MEDICAL & SURGICAL HISTORY:  Iron deficiency anemia  Vitamin D deficiency  Hypertension  Leiomyoma of uterus  Endometriosis  Hypertension  H/O laparoscopy: 1999 and 2000  S/P appendectomy  H/O ovarian cystectomy      REVIEW OF SYSTEMS:    MEDICATIONS  (STANDING):  enoxaparin Injectable 40 milliGRAM(s) SubCutaneous daily  famotidine    Tablet 20 milliGRAM(s) Oral two times a day  HYDROmorphone PCA (1 mG/mL) 30 milliLiter(s) PCA Continuous PCA Continuous  lactated ringers. 1000 milliLiter(s) (120 mL/Hr) IV Continuous <Continuous>  losartan 50 milliGRAM(s) Oral daily    MEDICATIONS  (PRN):  docusate sodium 100 milliGRAM(s) Oral three times a day PRN Stool Softening  ondansetron Injectable 4 milliGRAM(s) IV Push once PRN Postoperative Nausea and/or Vomiting      PE:    Allergies    Mushrooms (Vomiting; Diarrhea)  No Known Drug Allergies    Intolerances        FAMILY HISTORY:  Hypertension (Sibling)  Hypertension (Mother)      Vital Signs Last 24 Hrs  T(C): 37.5 (26 Jul 2018 07:13), Max: 38.2 (26 Jul 2018 04:50)  T(F): 99.5 (26 Jul 2018 07:13), Max: 100.7 (26 Jul 2018 04:50)  HR: 122 (26 Jul 2018 07:13) (103 - 126)  BP: 134/85 (26 Jul 2018 07:13) (112/75 - 134/85)  BP(mean): --  RR: 18 (26 Jul 2018 07:13) (18 - 18)  SpO2: 98% (26 Jul 2018 07:13) (97% - 99%)    PHYSICAL EXAM:    LABS:                        6.9    11.55 )-----------( 193      ( 26 Jul 2018 08:00 )             20.3     07-26    142  |  108  |  6<L>  ----------------------------<  100<H>  3.7   |  28  |  0.48<L>    Ca    7.6<L>      26 Jul 2018 08:00      PT/INR - ( 24 Jul 2018 14:59 )   PT: 13.1 sec;   INR: 1.20 ratio         PTT - ( 24 Jul 2018 14:59 )  PTT:25.8 sec    Urine Culture:     RADIOLOGY & ADDITIONAL STUDIES:

## 2018-07-27 LAB
ALBUMIN SERPL ELPH-MCNC: 2.3 G/DL — LOW (ref 3.3–5)
ALP SERPL-CCNC: 55 U/L — SIGNIFICANT CHANGE UP (ref 40–120)
ALT FLD-CCNC: 14 U/L — SIGNIFICANT CHANGE UP (ref 12–78)
ANION GAP SERPL CALC-SCNC: 7 MMOL/L — SIGNIFICANT CHANGE UP (ref 5–17)
AST SERPL-CCNC: 21 U/L — SIGNIFICANT CHANGE UP (ref 15–37)
BILIRUB SERPL-MCNC: 0.4 MG/DL — SIGNIFICANT CHANGE UP (ref 0.2–1.2)
BUN SERPL-MCNC: 6 MG/DL — LOW (ref 7–23)
CALCIUM SERPL-MCNC: 7.7 MG/DL — LOW (ref 8.5–10.1)
CHLORIDE SERPL-SCNC: 108 MMOL/L — SIGNIFICANT CHANGE UP (ref 96–108)
CO2 SERPL-SCNC: 25 MMOL/L — SIGNIFICANT CHANGE UP (ref 22–31)
CREAT FLD-MCNC: 0.46 MG/DL — SIGNIFICANT CHANGE UP
CREAT SERPL-MCNC: 0.54 MG/DL — SIGNIFICANT CHANGE UP (ref 0.5–1.3)
GLUCOSE SERPL-MCNC: 84 MG/DL — SIGNIFICANT CHANGE UP (ref 70–99)
POTASSIUM SERPL-MCNC: 3.8 MMOL/L — SIGNIFICANT CHANGE UP (ref 3.5–5.3)
POTASSIUM SERPL-SCNC: 3.8 MMOL/L — SIGNIFICANT CHANGE UP (ref 3.5–5.3)
PROT SERPL-MCNC: 5.6 G/DL — LOW (ref 6–8.3)
SODIUM SERPL-SCNC: 140 MMOL/L — SIGNIFICANT CHANGE UP (ref 135–145)

## 2018-07-27 RX ADMIN — FAMOTIDINE 20 MILLIGRAM(S): 10 INJECTION INTRAVENOUS at 17:28

## 2018-07-27 RX ADMIN — LOSARTAN POTASSIUM 50 MILLIGRAM(S): 100 TABLET, FILM COATED ORAL at 06:08

## 2018-07-27 RX ADMIN — FAMOTIDINE 20 MILLIGRAM(S): 10 INJECTION INTRAVENOUS at 06:07

## 2018-07-27 RX ADMIN — Medication 100 MILLIGRAM(S): at 06:07

## 2018-07-27 NOTE — PROGRESS NOTE ADULT - SUBJECTIVE AND OBJECTIVE BOX
POD #4. Afebrile, VSS. No significant drainage from DAGO over last 8 hrs. DAGO bulb removed. Abdomen soft, NT/ND, incision clean and dry. Extremities - no calf tenderness. DC tomorrow if stable with Johnson in place.

## 2018-07-27 NOTE — PROGRESS NOTE ADULT - SUBJECTIVE AND OBJECTIVE BOX
SURGERY    STATUS POST:  KAREN/BSO with repair of bladder injury POD 3.     SUBJECTIVE: Patient seen at beside with  present. Patient received 2 units of PRBC with good response from repeat CBC.  Patient with no complaints at this time. Denies any headache, chest pain, shortness of breath, palpitation, abdominal pain. nausea, vomiting, dizziness, bleeding.       OBJECTIVE:   T(C): 36.7 (07-27-18 @ 07:10), Max: 38.4 (07-26-18 @ 15:31)  HR: 96 (07-27-18 @ 07:10) (96 - 125)  BP: 120/80 (07-27-18 @ 07:10) (120/80 - 137/91)  RR: 18 (07-27-18 @ 07:10) (16 - 20)  SpO2: 98% (07-27-18 @ 07:10) (96% - 100%)  Wt(kg): --  CAPILLARY BLOOD GLUCOSE        I&O's Detail    26 Jul 2018 07:01  -  27 Jul 2018 07:00  --------------------------------------------------------  IN:    Packed Red Blood Cells: 301 mL  Total IN: 301 mL    OUT:    Bulb: 210 mL    Indwelling Catheter - Urethral: 3650 mL  Total OUT: 3860 mL    Total NET: -3559 mL          Physical exam:  General: A+O x 3 in NAD  Abdomen: soft nd/ nt, Bx x4, non tympanic to perscussion. wells in place.    MEDICATIONS  (STANDING):  ceFAZolin   IVPB 2000 milliGRAM(s) IV Intermittent every 8 hours  famotidine    Tablet 20 milliGRAM(s) Oral two times a day  losartan 50 milliGRAM(s) Oral daily    MEDICATIONS  (PRN):  docusate sodium 100 milliGRAM(s) Oral three times a day PRN Stool Softening  oxyCODONE    5 mG/acetaminophen 325 mG 1 Tablet(s) Oral every 4 hours PRN Mild Pain (1 - 3)  oxyCODONE    5 mG/acetaminophen 325 mG 2 Tablet(s) Oral every 4 hours PRN Moderate Pain (4 - 6)      LABS:                        9.5    12.92 )-----------( 186      ( 26 Jul 2018 22:12 )             28.2     07-27    140  |  108  |  6<L>  ----------------------------<  84  3.8   |  25  |  0.54    Ca    7.7<L>      27 Jul 2018 08:22    TPro  5.6<L>  /  Alb  2.3<L>  /  TBili  0.4  /  DBili  x   /  AST  21  /  ALT  14  /  AlkPhos  55  07-27    PT/INR - ( 26 Jul 2018 22:12 )   PT: 13.2 sec;   INR: 1.21 ratio         PTT - ( 26 Jul 2018 22:12 )  PTT:28.6 sec      RADIOLOGY & ADDITIONAL STUDIES:    Assessment/Plan: 37y Female s/p KAREN/BSO with repair of bladder injury POD 3, received 2 unit prbc yesterday with good response on repeat CBC.  - continue PO  - keep wells as outpatient, follow up with urology as outpatient for cystogram  - d/c planning  - will speak to Dr. Narvaez regarding d/c plan SURGERY    STATUS POST:  KAREN/BSO with repair of bladder injury POD 3.     SUBJECTIVE: Patient seen at beside with  present. Patient received 2 units of PRBC with good response from repeat CBC.  Patient with no complaints at this time. Denies any headache, chest pain, shortness of breath, palpitation, abdominal pain. nausea, vomiting, dizziness, bleeding.       OBJECTIVE:   T(C): 36.7 (07-27-18 @ 07:10), Max: 38.4 (07-26-18 @ 15:31)  HR: 96 (07-27-18 @ 07:10) (96 - 125)  BP: 120/80 (07-27-18 @ 07:10) (120/80 - 137/91)  RR: 18 (07-27-18 @ 07:10) (16 - 20)  SpO2: 98% (07-27-18 @ 07:10) (96% - 100%)  Wt(kg): --  CAPILLARY BLOOD GLUCOSE        I&O's Detail    26 Jul 2018 07:01  -  27 Jul 2018 07:00  --------------------------------------------------------  IN:    Packed Red Blood Cells: 301 mL  Total IN: 301 mL    OUT:    Bulb: 210 mL    Indwelling Catheter - Urethral: 3650 mL  Total OUT: 3860 mL    Total NET: -3559 mL          Physical exam:  General: A+O x 3 in NAD  Abdomen: soft nd/ nt, Bx x4, non tympanic to perscussion. wells in place.    MEDICATIONS  (STANDING):  ceFAZolin   IVPB 2000 milliGRAM(s) IV Intermittent every 8 hours  famotidine    Tablet 20 milliGRAM(s) Oral two times a day  losartan 50 milliGRAM(s) Oral daily    MEDICATIONS  (PRN):  docusate sodium 100 milliGRAM(s) Oral three times a day PRN Stool Softening  oxyCODONE    5 mG/acetaminophen 325 mG 1 Tablet(s) Oral every 4 hours PRN Mild Pain (1 - 3)  oxyCODONE    5 mG/acetaminophen 325 mG 2 Tablet(s) Oral every 4 hours PRN Moderate Pain (4 - 6)      LABS:                        9.5    12.92 )-----------( 186      ( 26 Jul 2018 22:12 )             28.2     07-27    140  |  108  |  6<L>  ----------------------------<  84  3.8   |  25  |  0.54    Ca    7.7<L>      27 Jul 2018 08:22    TPro  5.6<L>  /  Alb  2.3<L>  /  TBili  0.4  /  DBili  x   /  AST  21  /  ALT  14  /  AlkPhos  55  07-27    PT/INR - ( 26 Jul 2018 22:12 )   PT: 13.2 sec;   INR: 1.21 ratio         PTT - ( 26 Jul 2018 22:12 )  PTT:28.6 sec      RADIOLOGY & ADDITIONAL STUDIES:    Assessment/Plan: 37y Female s/p KAREN/BSO with repair of bladder injury POD 3, received 2 unit prbc yesterday with good response on repeat CBC.  - continue PO  - keep wells as outpatient, follow up with urology as outpatient for cystogram  - d/c ancef  - will send DAGO fluid for creatnine to assess for urinary leak  - Plan discussed with Dr Narvaez at 10:10am  - possible d/c later

## 2018-07-27 NOTE — PROGRESS NOTE ADULT - SUBJECTIVE AND OBJECTIVE BOX
Subjective: Pt tolerating PO, without complaints.    Objective:  Vital Signs Last 24 Hrs  T(C): 36.7 (27 Jul 2018 07:10), Max: 38.4 (26 Jul 2018 15:31)  T(F): 98 (27 Jul 2018 07:10), Max: 101.2 (26 Jul 2018 15:31)  HR: 96 (27 Jul 2018 07:10) (96 - 114)  BP: 120/80 (27 Jul 2018 07:10) (120/80 - 137/91)  BP(mean): --  RR: 18 (27 Jul 2018 07:10) (16 - 18)  SpO2: 98% (27 Jul 2018 07:10) (96% - 100%)    Heent: CALI, ABADOM  Pul: clear  Cor: RSR, without murmurs  Abdomen: Normal bowel sounds, without distension  Incision clean and closed  DAGO d/theresa  Extremities: without edema, motor/sensory intact, without calf pain, Homans sign negative.                        9.5    10.27 )-----------( 195      ( 27 Jul 2018 10:06 )             28.7       07-27    140  |  108  |  6<L>  ----------------------------<  84  3.8   |  25  |  0.54    Ca    7.7<L>      27 Jul 2018 08:22    TPro  5.6<L>  /  Alb  2.3<L>  /  TBili  0.4  /  DBili  x   /  AST  21  /  ALT  14  /  AlkPhos  55  07-27 07-26 @ 07:01 - 07-27 @ 07:00  --------------------------------------------------------  IN:    Packed Red Blood Cells: 301 mL  Total IN: 301 mL    OUT:    Bulb: 210 mL    Indwelling Catheter - Urethral: 3650 mL  Total OUT: 3860 mL    Total NET: -3559 mL      07-27 @ 07:01 - 07-27 @ 15:13  --------------------------------------------------------  IN:  Total IN: 0 mL    OUT:    Bulb: 20 mL    Indwelling Catheter - Urethral: 1350 mL  Total OUT: 1370 mL    Total NET: -1370 mL

## 2018-07-27 NOTE — PROGRESS NOTE ADULT - SUBJECTIVE AND OBJECTIVE BOX
PAST MEDICAL & SURGICAL HISTORY:  Iron deficiency anemia  Vitamin D deficiency  Hypertension  Leiomyoma of uterus  Endometriosis  Hypertension  H/O laparoscopy: 1999 and 2000  S/P appendectomy  H/O ovarian cystectomy      REVIEW OF SYSTEMS:    MEDICATIONS  (STANDING):  famotidine    Tablet 20 milliGRAM(s) Oral two times a day  losartan 50 milliGRAM(s) Oral daily    MEDICATIONS  (PRN):  docusate sodium 100 milliGRAM(s) Oral three times a day PRN Stool Softening  oxyCODONE    5 mG/acetaminophen 325 mG 1 Tablet(s) Oral every 4 hours PRN Mild Pain (1 - 3)  oxyCODONE    5 mG/acetaminophen 325 mG 2 Tablet(s) Oral every 4 hours PRN Moderate Pain (4 - 6)      Allergies    Mushrooms (Vomiting; Diarrhea)  No Known Drug Allergies    Intolerances        FAMILY HISTORY:  Hypertension (Sibling)  Hypertension (Mother)      Vital Signs Last 24 Hrs  T(C): 36.7 (27 Jul 2018 07:10), Max: 38.4 (26 Jul 2018 15:31)  T(F): 98 (27 Jul 2018 07:10), Max: 101.2 (26 Jul 2018 15:31)  HR: 96 (27 Jul 2018 07:10) (96 - 125)  BP: 120/80 (27 Jul 2018 07:10) (120/80 - 137/91)  BP(mean): --  RR: 18 (27 Jul 2018 07:10) (16 - 20)  SpO2: 98% (27 Jul 2018 07:10) (96% - 100%)      LABS:                        9.5    10.27 )-----------( 195      ( 27 Jul 2018 10:06 )             28.7     07-27    140  |  108  |  6<L>  ----------------------------<  84  3.8   |  25  |  0.54    Ca    7.7<L>      27 Jul 2018 08:22    TPro  5.6<L>  /  Alb  2.3<L>  /  TBili  0.4  /  DBili  x   /  AST  21  /  ALT  14  /  AlkPhos  55  07-27    PT/INR - ( 26 Jul 2018 22:12 )   PT: 13.2 sec;   INR: 1.21 ratio         PTT - ( 26 Jul 2018 22:12 )  PTT:28.6 sec    Urine Culture:     RADIOLOGY & ADDITIONAL STUDIES:

## 2018-07-27 NOTE — PROGRESS NOTE ADULT - ASSESSMENT
Urology POD# 3  Tm 101.2 yesterday post transfusion, afeb since then  DAGO drained 145 cc in 24 hours  Wells draining grossly clear urine, good output  Hgb 9.5 post transfusion, creat stable at 0.54; WBC 12.9  Pt faith ambulating and OOB chair, looks and feels more comfortable  If pt is discharged before the 10th post o day, she needs to go home with wells, pt and her  are awarre; DAGO being follow at present

## 2018-07-28 VITALS
TEMPERATURE: 98 F | HEART RATE: 99 BPM | RESPIRATION RATE: 16 BRPM | DIASTOLIC BLOOD PRESSURE: 75 MMHG | SYSTOLIC BLOOD PRESSURE: 119 MMHG | OXYGEN SATURATION: 99 %

## 2018-07-28 LAB
ANION GAP SERPL CALC-SCNC: 5 MMOL/L — SIGNIFICANT CHANGE UP (ref 5–17)
BUN SERPL-MCNC: 8 MG/DL — SIGNIFICANT CHANGE UP (ref 7–23)
CALCIUM SERPL-MCNC: 8 MG/DL — LOW (ref 8.5–10.1)
CHLORIDE SERPL-SCNC: 108 MMOL/L — SIGNIFICANT CHANGE UP (ref 96–108)
CO2 SERPL-SCNC: 29 MMOL/L — SIGNIFICANT CHANGE UP (ref 22–31)
CREAT SERPL-MCNC: 0.5 MG/DL — SIGNIFICANT CHANGE UP (ref 0.5–1.3)
GLUCOSE SERPL-MCNC: 89 MG/DL — SIGNIFICANT CHANGE UP (ref 70–99)
HCT VFR BLD CALC: 28.7 % — LOW (ref 34.5–45)
HGB BLD-MCNC: 9.5 G/DL — LOW (ref 11.5–15.5)
MAGNESIUM SERPL-MCNC: 2.4 MG/DL — SIGNIFICANT CHANGE UP (ref 1.6–2.6)
MCHC RBC-ENTMCNC: 27.5 PG — SIGNIFICANT CHANGE UP (ref 27–34)
MCHC RBC-ENTMCNC: 33.1 GM/DL — SIGNIFICANT CHANGE UP (ref 32–36)
MCV RBC AUTO: 82.9 FL — SIGNIFICANT CHANGE UP (ref 80–100)
NRBC # BLD: 0 /100 WBCS — SIGNIFICANT CHANGE UP (ref 0–0)
PHOSPHATE SERPL-MCNC: 2.8 MG/DL — SIGNIFICANT CHANGE UP (ref 2.5–4.5)
PLATELET # BLD AUTO: 239 K/UL — SIGNIFICANT CHANGE UP (ref 150–400)
POTASSIUM SERPL-MCNC: 4 MMOL/L — SIGNIFICANT CHANGE UP (ref 3.5–5.3)
POTASSIUM SERPL-SCNC: 4 MMOL/L — SIGNIFICANT CHANGE UP (ref 3.5–5.3)
RBC # BLD: 3.46 M/UL — LOW (ref 3.8–5.2)
RBC # FLD: 16.6 % — HIGH (ref 10.3–14.5)
SODIUM SERPL-SCNC: 142 MMOL/L — SIGNIFICANT CHANGE UP (ref 135–145)
WBC # BLD: 8.38 K/UL — SIGNIFICANT CHANGE UP (ref 3.8–10.5)
WBC # FLD AUTO: 8.38 K/UL — SIGNIFICANT CHANGE UP (ref 3.8–10.5)

## 2018-07-28 PROCEDURE — 85610 PROTHROMBIN TIME: CPT

## 2018-07-28 PROCEDURE — 82962 GLUCOSE BLOOD TEST: CPT

## 2018-07-28 PROCEDURE — 85730 THROMBOPLASTIN TIME PARTIAL: CPT

## 2018-07-28 PROCEDURE — P9016: CPT

## 2018-07-28 PROCEDURE — 85027 COMPLETE CBC AUTOMATED: CPT

## 2018-07-28 PROCEDURE — 36415 COLL VENOUS BLD VENIPUNCTURE: CPT

## 2018-07-28 PROCEDURE — 84100 ASSAY OF PHOSPHORUS: CPT

## 2018-07-28 PROCEDURE — 83735 ASSAY OF MAGNESIUM: CPT

## 2018-07-28 PROCEDURE — 80048 BASIC METABOLIC PNL TOTAL CA: CPT

## 2018-07-28 PROCEDURE — 82570 ASSAY OF URINE CREATININE: CPT

## 2018-07-28 PROCEDURE — 80053 COMPREHEN METABOLIC PANEL: CPT

## 2018-07-28 PROCEDURE — 88305 TISSUE EXAM BY PATHOLOGIST: CPT

## 2018-07-28 PROCEDURE — 36430 TRANSFUSION BLD/BLD COMPNT: CPT

## 2018-07-28 PROCEDURE — 88307 TISSUE EXAM BY PATHOLOGIST: CPT

## 2018-07-28 RX ORDER — IBUPROFEN 200 MG
1 TABLET ORAL
Qty: 30 | Refills: 1 | OUTPATIENT
Start: 2018-07-28

## 2018-07-28 RX ORDER — ACETAMINOPHEN 500 MG
0 TABLET ORAL
Qty: 0 | Refills: 0 | COMMUNITY

## 2018-07-28 RX ORDER — POLYETHYLENE GLYCOL 3350 17 G/17G
17 POWDER, FOR SOLUTION ORAL DAILY
Qty: 0 | Refills: 0 | Status: DISCONTINUED | OUTPATIENT
Start: 2018-07-28 | End: 2018-07-28

## 2018-07-28 RX ORDER — DOCUSATE SODIUM 100 MG
1 CAPSULE ORAL
Qty: 10 | Refills: 0 | OUTPATIENT
Start: 2018-07-28

## 2018-07-28 RX ADMIN — LOSARTAN POTASSIUM 50 MILLIGRAM(S): 100 TABLET, FILM COATED ORAL at 05:25

## 2018-07-28 RX ADMIN — POLYETHYLENE GLYCOL 3350 17 GRAM(S): 17 POWDER, FOR SOLUTION ORAL at 13:17

## 2018-07-28 RX ADMIN — FAMOTIDINE 20 MILLIGRAM(S): 10 INJECTION INTRAVENOUS at 05:25

## 2018-07-28 NOTE — DISCHARGE NOTE ADULT - HOSPITAL COURSE
Complicated hysterectomy for fibroid uterus with extensive intra-abdominal adhesions with cystotomy and repair done. Pt is safe for DC home today with follow up with GYN and urology.

## 2018-07-28 NOTE — DISCHARGE NOTE ADULT - MEDICATION SUMMARY - MEDICATIONS TO TAKE
I will START or STAY ON the medications listed below when I get home from the hospital:     mg oral tablet  -- 1 tab(s) by mouth every 6 hours, As Needed for mild or moderate pain  -- Do not take this drug if you are pregnant.  It is very important that you take or use this exactly as directed.  Do not skip doses or discontinue unless directed by your doctor.  May cause drowsiness or dizziness.  Obtain medical advice before taking any non-prescription drugs as some may affect the action of this medication.  Take with food or milk.    -- Indication: For  mild - moderate pain    oxyCODONE-acetaminophen 5 mg-325 mg oral tablet  -- 1 tab(s) by mouth every 4 hours, As needed, Mild Pain (1 - 3)  -- Indication: For moderate-severe pain    losartan-hydrochlorothiazide 50 mg-12.5 mg oral tablet  -- 1 tab(s) by mouth once a day  -- Indication: For HTN    famotidine 20 mg oral tablet  -- orally 2 times a day  -- Indication: For GERD    docusate sodium 100 mg oral capsule  -- 1 cap(s) by mouth 3 times a day, As needed, Stool Softening  -- Indication: For Stool softener    Vitamin D3 50,000 intl units oral capsule  -- 1 cap(s) by mouth once a month  -- Indication: For Vitamin D deficiency I will START or STAY ON the medications listed below when I get home from the hospital:     mg oral tablet  -- 1 tab(s) by mouth every 6 hours, As Needed -for moderate pain - for mild pain   -- Do not take this drug if you are pregnant.  It is very important that you take or use this exactly as directed.  Do not skip doses or discontinue unless directed by your doctor.  May cause drowsiness or dizziness.  Obtain medical advice before taking any non-prescription drugs as some may affect the action of this medication.  Take with food or milk.    -- Indication: For pain    losartan-hydrochlorothiazide 50 mg-12.5 mg oral tablet  -- 1 tab(s) by mouth once a day  -- Indication: For HTN    famotidine 20 mg oral tablet  -- orally 2 times a day  -- Indication: For GERD    docusate sodium 100 mg oral capsule  -- 1 cap(s) by mouth 3 times a day, As needed, Stool Softening  -- Indication: For Stool softener    Vitamin D3 50,000 intl units oral capsule  -- 1 cap(s) by mouth once a month  -- Indication: For Vitamin D deficiency

## 2018-07-28 NOTE — PROGRESS NOTE ADULT - SUBJECTIVE AND OBJECTIVE BOX
pt seen  no complaints  ambulating  tolerating diet  mikey removed yesterday  +F-bm  ICU Vital Signs Last 24 Hrs  T(C): 36.9 (28 Jul 2018 07:26), Max: 37.2 (27 Jul 2018 15:45)  T(F): 98.4 (28 Jul 2018 07:26), Max: 98.9 (27 Jul 2018 15:45)  HR: 99 (28 Jul 2018 07:26) (95 - 100)  BP: 119/75 (28 Jul 2018 07:26) (99/72 - 132/72)  BP(mean): --  ABP: --  ABP(mean): --  RR: 16 (28 Jul 2018 07:26) (16 - 16)  SpO2: 99% (28 Jul 2018 07:26) (99% - 100%)  gen-NAD  resp-clear  abd-soft ND, incision c/d/i  LE-no edema, nonten                      9.5    8.38  )-----------( 239      ( 28 Jul 2018 08:46 )             28.7   celestine

## 2018-07-28 NOTE — DISCHARGE NOTE ADULT - CARE PROVIDER_API CALL
Akosua Dixon), Obstetrics and Gynecology  [address] [place] [address]  Phone: [phone]  Fax: [phone]    Sonia Narvaez), Obstetrics and Gynecology  [address] [place] [address]  Phone: [phone]  Fax: [phone]    Ziyad Das), Urology  [address] [place] [address]  Phone: [phone]  Fax: [phone]

## 2018-07-28 NOTE — DISCHARGE NOTE ADULT - ADDITIONAL INSTRUCTIONS
Pt will be discharged with Johnson in place, needs to follow up with urology as an outpatient in 5 days.

## 2018-07-28 NOTE — DISCHARGE NOTE ADULT - PLAN OF CARE
Improved pain and vaginal bleeding Continue ibuprofen and percocet as needed for pain  Follow up in 1 week with GYN - Dr. Narvaez or Dr. Dixon

## 2018-07-28 NOTE — DISCHARGE NOTE ADULT - MEDICATION SUMMARY - MEDICATIONS TO STOP TAKING
I will STOP taking the medications listed below when I get home from the hospital:    Naproxyn    acetaminophen 500 mg oral tablet

## 2018-07-28 NOTE — DISCHARGE NOTE ADULT - CARE PLAN
Principal Discharge DX:	Leiomyoma of uterus  Goal:	Improved pain and vaginal bleeding  Assessment and plan of treatment:	Continue ibuprofen and percocet as needed for pain  Follow up in 1 week with GYN - Dr. Narvaez or Dr. Dixon

## 2018-07-28 NOTE — PROGRESS NOTE ADULT - SUBJECTIVE AND OBJECTIVE BOX
INTERVAL HPI/OVERNIGHT EVENTS: Pt is being d/theresa today, J/P had minimal drainage    MEDICATIONS  (STANDING):  famotidine    Tablet 20 milliGRAM(s) Oral two times a day  losartan 50 milliGRAM(s) Oral daily    MEDICATIONS  (PRN):  docusate sodium 100 milliGRAM(s) Oral three times a day PRN Stool Softening  oxyCODONE    5 mG/acetaminophen 325 mG 1 Tablet(s) Oral every 4 hours PRN Mild Pain (1 - 3)  oxyCODONE    5 mG/acetaminophen 325 mG 2 Tablet(s) Oral every 4 hours PRN Moderate Pain (4 - 6)        Vital Signs Last 24 Hrs  T(C): 36.9 (28 Jul 2018 07:26), Max: 37.2 (27 Jul 2018 15:45)  T(F): 98.4 (28 Jul 2018 07:26), Max: 98.9 (27 Jul 2018 15:45)  HR: 99 (28 Jul 2018 07:26) (95 - 100)  BP: 119/75 (28 Jul 2018 07:26) (99/72 - 132/72)  BP(mean): --  RR: 16 (28 Jul 2018 07:26) (16 - 16)  SpO2: 99% (28 Jul 2018 07:26) (99% - 100%)        LABS:                        9.5    8.38  )-----------( 239      ( 28 Jul 2018 08:46 )             28.7     07-28    142  |  108  |  8   ----------------------------<  89  4.0   |  29  |  0.50    Ca    8.0<L>      28 Jul 2018 08:46    TPro  5.6<L>  /  Alb  2.3<L>  /  TBili  0.4  /  DBili  x   /  AST  21  /  ALT  14  /  AlkPhos  55  07-27    PT/INR - ( 26 Jul 2018 22:12 )   PT: 13.2 sec;   INR: 1.21 ratio         PTT - ( 26 Jul 2018 22:12 )  PTT:28.6 sec

## 2018-07-28 NOTE — DISCHARGE NOTE ADULT - PATIENT PORTAL LINK FT
You can access the [facility], offered by [facility], by registering with the following website: [email or web address]

## 2018-07-28 NOTE — PROGRESS NOTE ADULT - PROBLEM SELECTOR PLAN 1
Pt will be d/theresa with russell, and return here for cystogram later this week. Discussed with pts , who interpreted for pt

## 2018-07-31 LAB — SURGICAL PATHOLOGY FINAL REPORT - CH: SIGNIFICANT CHANGE UP

## 2018-08-01 PROBLEM — E55.9 VITAMIN D DEFICIENCY, UNSPECIFIED: Chronic | Status: ACTIVE | Noted: 2018-07-18

## 2018-08-01 PROBLEM — D50.9 IRON DEFICIENCY ANEMIA, UNSPECIFIED: Chronic | Status: ACTIVE | Noted: 2018-07-18

## 2018-08-01 PROBLEM — D25.9 LEIOMYOMA OF UTERUS, UNSPECIFIED: Chronic | Status: ACTIVE | Noted: 2018-07-18

## 2018-08-01 PROBLEM — I10 ESSENTIAL (PRIMARY) HYPERTENSION: Chronic | Status: ACTIVE | Noted: 2018-07-18

## 2018-08-23 LAB — SURGICAL PATHOLOGY FINAL REPORT - CH: SIGNIFICANT CHANGE UP

## 2018-09-19 NOTE — H&P PST ADULT - BSA (M2)
Mercedes Flap Text: The defect edges were debeveled with a #15 scalpel blade.  Given the location of the defect, shape of the defect and the proximity to free margins a Mercedes flap was deemed most appropriate.  Using a sterile surgical marker, an appropriate advancement flap was drawn incorporating the defect and placing the expected incisions within the relaxed skin tension lines where possible. The area thus outlined was incised deep to adipose tissue with a #15 scalpel blade.  The skin margins were undermined to an appropriate distance in all directions utilizing iris scissors. 1.84

## 2021-01-14 NOTE — DISCHARGE NOTE ADULT - VISION (WITH CORRECTIVE LENSES IF THE PATIENT USUALLY WEARS THEM):
Clinic Care Coordination Contact    Follow Up Progress Note      Assessment: PC to Natan's brother. They have not heard from [facility] yet . [facility] encouraged them to reach out tomorrow if no call received today. [facility] will follow up next week to ensure evaluation has been set up.    Goals addressed this encounter:   Goals Addressed    None          Intervention/Education provided during outreach: NA          Plan:   [facility] to outreach in 3 business days to ensure referral to [facility] has been established.  Care Coordinator will follow up in 3 days.   Normal vision: sees adequately in most situations; can see medication labels, newsprint

## 2023-08-04 NOTE — H&P PST ADULT - PSYCHIATRIC
negative Affect and characteristics of appearance, verbalizations, behaviors are appropriate Xolair Pregnancy And Lactation Text: This medication is Pregnancy Category B and is considered safe during pregnancy. This medication is excreted in breast milk.

## 2024-07-16 NOTE — H&P PST ADULT - PMH
Anesthesia Post Evaluation    Patient: Lissy Palencia    Procedure(s) Performed: Procedure(s) (LRB):  EGD (ESOPHAGOGASTRODUODENOSCOPY) (N/A)  ULTRASOUND, UPPER GI TRACT, ENDOSCOPIC (N/A)    Final Anesthesia Type: general      Patient location during evaluation: PACU  Patient participation: Yes- Able to Participate  Level of consciousness: awake and alert and oriented  Post-procedure vital signs: reviewed and stable  Pain management: adequate  Airway patency: patent    PONV status at discharge: No PONV  Anesthetic complications: no      Cardiovascular status: hemodynamically stable  Respiratory status: unassisted, spontaneous ventilation and room air  Hydration status: euvolemic  Follow-up not needed.              Vitals Value Taken Time   /71 07/10/24 0931   Temp 36.4 °C (97.5 °F) 07/10/24 0852   Pulse 53 07/10/24 0931   Resp 27 07/10/24 0931   SpO2 94 % 07/10/24 0931   Vitals shown include unfiled device data.      Event Time   Out of Recovery 09:42:00         Pain/Sahil Score: No data recorded         Endometriosis    Hypertension    Hypertension    Iron deficiency anemia    Leiomyoma of uterus    Vitamin D deficiency
